# Patient Record
Sex: MALE | Race: WHITE | Employment: OTHER
[De-identification: names, ages, dates, MRNs, and addresses within clinical notes are randomized per-mention and may not be internally consistent; named-entity substitution may affect disease eponyms.]

---

## 2017-02-20 NOTE — H&P
Gastroenterology Outpatient History and Physical    Patient: Colton Jones    Physician: Sherly Silva MD    Vital Signs:See RN notes    Allergies: No Known Allergies    Chief Complaint: personal history colon cancer    History of Present Illness: no symptoms; personal history colon cancer, colon polyps    Justification for Procedure: age    History:  Past Medical History   Diagnosis Date    Cancer ([facility]      thyroiid    Diabetes ([facility]     GERD (gastroesophageal reflux disease)     Hypertension     Stroke ([facility]      3/2015    Thyroid disease      throidectomy r/t ca      Past Surgical History   Procedure Laterality Date    Hx orthopaedic  1/24/2013     R rotator cuff     Pr abdomen surgery proc unlisted  aug 2007     hernia rrepairs    Hx heent  sep 2008     thyroidectomy      Social History     Social History    Marital status:      Spouse name: N/A    Number of children: N/A    Years of education: N/A     Social History Main Topics    Smoking status: Not on file    Smokeless tobacco: Not on file    Alcohol use Not on file    Drug use: Not on file    Sexual activity: Not on file     Other Topics Concern    Not on file     Social History Narrative    No family history on file. Medications:   Prior to Admission medications    Medication Sig Start Date End Date Taking? Authorizing Provider   valsartan (DIOVAN) 160 mg tablet Take  by mouth daily. Historical Provider   B.infantis-B.ani-B.long-B.bifi (PROBIOTIC 4X) 10-15 mg TbEC Take  by mouth. Historical Provider   atorvastatin (LIPITOR) 10 mg tablet Take 10 mg by mouth daily. Historical Provider   metFORMIN (GLUCOPHAGE) 500 mg tablet Take 500 mg by mouth two (2) times daily (with meals). Historical Provider   levothyroxine (SYNTHROID) 175 mcg tablet Take 175 mcg by mouth Daily (before breakfast). Historical Provider   metoprolol (LOPRESSOR) 100 mg tablet Take 50 mg by mouth daily.     Historical Provider   amLODIPine (NORVASC) 10 mg tablet Take 10 mg by mouth daily. Historical Provider   hydrochlorothiazide (HYDRODIURIL) 25 mg tablet Take 25 mg by mouth daily. Historical Provider   amLODIPine-atorvastatatin (CADUET) 10-10 mg per tablet Take 1 Tab by mouth daily. Historical Provider   Dexlansoprazole (DEXILANT) 60 mg CpDM Take 60 mg by mouth daily. Historical Provider   clopidogrel (PLAVIX) 75 mg tablet Take 75 mg by mouth daily. Historical Provider   travoprost (TRAVATAN Z) 0.004 % ophthalmic solution Administer 1 Drop to both eyes every evening. Historical Provider   insulin mixture 75-25 (HUMALOG MIX 75-25) 100 unit/mL (75-25) Susp 35 Units by SubCUTAneous route daily (after breakfast). Historical Provider   insulin mixture 75-25 (HUMALOG MIX 75-25) 100 unit/mL (75-25) Susp 25 Units by SubCUTAneous route daily (after dinner). Historical Provider   cetirizine (ZYRTEC) 10 mg tablet Take 10 mg by mouth daily. Historical Provider   omega-3 fatty acids-vitamin e (FISH OIL) 1,000 mg cap Take 1 Cap by mouth daily. Historical Provider   multivitamin (ONE A DAY) tablet Take 1 Tab by mouth daily. Historical Provider   Aspirin, Buffered 81 mg Tab Take 81 mg by mouth. Historical Provider       Physical Exam:   General: alert, cooperative, no distress   HEENT: Head: Normocephalic, no lesions, without obvious abnormality. Heart: regular rate and rhythm   Lungs: chest clear, no wheezing, rales, normal symmetric air entry   Abdominal: Bowel sounds are normal, liver is not enlarged, spleen is not enlarged           Findings/Diagnosis: personal history colon cancer    Plan of Care/Planned Procedure: I've discussed colonoscopy possible biopsy, polypectomy, cautery, injection, alternatives, complications including but not limited to pain, cardiopulmonary event, bleeding, perforation requiring additional blood transfusion or operative repair; all questions answered.     Signed By: Delgado Soto MD     February 27, 2017

## 2017-02-27 ENCOUNTER — SURGERY (OUTPATIENT)
Age: 77
End: 2017-02-27

## 2017-02-27 ENCOUNTER — ANESTHESIA (OUTPATIENT)
Dept: ENDOSCOPY | Age: 77
End: 2017-02-27
Payer: MEDICARE

## 2017-02-27 ENCOUNTER — HOSPITAL ENCOUNTER (OUTPATIENT)
Age: 77
Setting detail: OUTPATIENT SURGERY
Discharge: HOME | End: 2017-02-27
Attending: INTERNAL MEDICINE | Admitting: INTERNAL MEDICINE
Payer: MEDICARE

## 2017-02-27 ENCOUNTER — ANESTHESIA EVENT (OUTPATIENT)
Dept: ENDOSCOPY | Age: 77
End: 2017-02-27
Payer: MEDICARE

## 2017-02-27 VITALS
HEART RATE: 58 BPM | RESPIRATION RATE: 18 BRPM | HEIGHT: 67 IN | BODY MASS INDEX: 31.39 KG/M2 | TEMPERATURE: 97.9 F | DIASTOLIC BLOOD PRESSURE: 70 MMHG | SYSTOLIC BLOOD PRESSURE: 169 MMHG | WEIGHT: 200 LBS | OXYGEN SATURATION: 95 %

## 2017-02-27 LAB
GLUCOSE BLD STRIP.AUTO-MCNC: 117 MG/DL (ref 65–100)
SERVICE CMNT-IMP: ABNORMAL

## 2017-02-27 PROCEDURE — 74011250636 HC RX REV CODE- 250/636: Performed by: INTERNAL MEDICINE

## 2017-02-27 PROCEDURE — 74011250636 HC RX REV CODE- 250/636

## 2017-02-27 PROCEDURE — 76060000031 HC ANESTHESIA FIRST 0.5 HR: Performed by: INTERNAL MEDICINE

## 2017-02-27 PROCEDURE — 76040000019: Performed by: INTERNAL MEDICINE

## 2017-02-27 PROCEDURE — 77030013992 HC SNR POLYP ENDOSC BSC -B: Performed by: INTERNAL MEDICINE

## 2017-02-27 PROCEDURE — 88305 TISSUE EXAM BY PATHOLOGIST: CPT | Performed by: INTERNAL MEDICINE

## 2017-02-27 PROCEDURE — 82962 GLUCOSE BLOOD TEST: CPT

## 2017-02-27 RX ORDER — DEXTROMETHORPHAN/PSEUDOEPHED 2.5-7.5/.8
1.2 DROPS ORAL
Status: DISCONTINUED | OUTPATIENT
Start: 2017-02-27 | End: 2017-02-27 | Stop reason: HOSPADM

## 2017-02-27 RX ORDER — PROPOFOL 10 MG/ML
INJECTION, EMULSION INTRAVENOUS AS NEEDED
Status: DISCONTINUED | OUTPATIENT
Start: 2017-02-27 | End: 2017-02-27 | Stop reason: HOSPADM

## 2017-02-27 RX ORDER — PROPOFOL 10 MG/ML
INJECTION, EMULSION INTRAVENOUS
Status: DISCONTINUED | OUTPATIENT
Start: 2017-02-27 | End: 2017-02-27 | Stop reason: HOSPADM

## 2017-02-27 RX ORDER — NALOXONE HYDROCHLORIDE 0.4 MG/ML
0.4 INJECTION, SOLUTION INTRAMUSCULAR; INTRAVENOUS; SUBCUTANEOUS
Status: DISCONTINUED | OUTPATIENT
Start: 2017-02-27 | End: 2017-02-27 | Stop reason: HOSPADM

## 2017-02-27 RX ORDER — FENTANYL CITRATE 50 UG/ML
100 INJECTION, SOLUTION INTRAMUSCULAR; INTRAVENOUS
Status: DISCONTINUED | OUTPATIENT
Start: 2017-02-27 | End: 2017-02-27 | Stop reason: HOSPADM

## 2017-02-27 RX ORDER — VALSARTAN 80 MG/1
80 TABLET ORAL DAILY
COMMUNITY

## 2017-02-27 RX ORDER — FLUMAZENIL 0.1 MG/ML
0.2 INJECTION INTRAVENOUS
Status: DISCONTINUED | OUTPATIENT
Start: 2017-02-27 | End: 2017-02-27 | Stop reason: HOSPADM

## 2017-02-27 RX ORDER — MIDAZOLAM HYDROCHLORIDE 1 MG/ML
.25-5 INJECTION, SOLUTION INTRAMUSCULAR; INTRAVENOUS
Status: DISCONTINUED | OUTPATIENT
Start: 2017-02-27 | End: 2017-02-27 | Stop reason: HOSPADM

## 2017-02-27 RX ORDER — ATROPINE SULFATE 0.1 MG/ML
0.5 INJECTION INTRAVENOUS
Status: DISCONTINUED | OUTPATIENT
Start: 2017-02-27 | End: 2017-02-27 | Stop reason: HOSPADM

## 2017-02-27 RX ORDER — EPINEPHRINE 0.1 MG/ML
1 INJECTION INTRACARDIAC; INTRAVENOUS
Status: DISCONTINUED | OUTPATIENT
Start: 2017-02-27 | End: 2017-02-27 | Stop reason: HOSPADM

## 2017-02-27 RX ORDER — SODIUM CHLORIDE 9 MG/ML
50 INJECTION, SOLUTION INTRAVENOUS CONTINUOUS
Status: DISCONTINUED | OUTPATIENT
Start: 2017-02-27 | End: 2017-02-27 | Stop reason: HOSPADM

## 2017-02-27 RX ADMIN — PROPOFOL 80 MG: 10 INJECTION, EMULSION INTRAVENOUS at 08:57

## 2017-02-27 RX ADMIN — SODIUM CHLORIDE 50 ML/HR: 900 INJECTION, SOLUTION INTRAVENOUS at 08:58

## 2017-02-27 RX ADMIN — PROPOFOL 100 MCG/KG/MIN: 10 INJECTION, EMULSION INTRAVENOUS at 08:57

## 2017-02-27 RX ADMIN — PROPOFOL 40 MG: 10 INJECTION, EMULSION INTRAVENOUS at 08:58

## 2017-02-27 RX ADMIN — PROPOFOL 20 MG: 10 INJECTION, EMULSION INTRAVENOUS at 08:59

## 2017-02-27 NOTE — ANESTHESIA POSTPROCEDURE EVALUATION
Post-Anesthesia Evaluation and Assessment    Patient: Peg Kim MRN: [id number]  SSN: [id number]    YOB: 1940  Age: 68 y.o. Sex: male       Cardiovascular Function/Vital Signs  Visit Vitals    /72    Pulse 66    Temp 36.8 °C (98.2 °F)    Resp 13    Ht 5' 7\" (1.702 m)    Wt 90.7 kg (200 lb)    SpO2 96%    BMI 31.32 kg/m2       Patient is status post MAC anesthesia for Procedure(s):  COLONOSCOPY  ENDOSCOPIC POLYPECTOMY. Nausea/Vomiting: None    Postoperative hydration reviewed and adequate. Pain:  Pain Scale 1: Numeric (0 - 10) (02/27/17 0827)  Pain Intensity 1: 0 (02/27/17 0827)   Managed    Neurological Status: At baseline    Mental Status and Level of Consciousness: Arousable    Pulmonary Status:   O2 Device: Room air (02/27/17 0827)   Adequate oxygenation and airway patent    Complications related to anesthesia: None    Post-anesthesia assessment completed.  No concerns    Signed By: Trisha Duncan MD     February 27, 2017

## 2017-02-27 NOTE — PROGRESS NOTES
See anesthesia flow-sheet for procedural sedation and vital signs. No respiratory distress. Abdomen without distention. Stable for transfer to recovery per anesthesia. Polyp specimens sent to lab.

## 2017-02-27 NOTE — IP AVS SNAPSHOT
Daphnie Mckinnon 
 
 
 [address] [place] [address] 
[phone] Patient: Hedy Bautista MRN: [id number] ATU:5/54/9760 You are allergic to the following No active allergies Recent Documentation Height  
  
  
  
  
  
 1.702 m Emergency Contacts Name Discharge Info Relation Home Work Mobile [place]  Spouse [3] [phone] About your hospitalization You were admitted on:  February 27, 2017 You last received care in the:  [facility] ENDOSCOPY You were discharged on:  February 27, 2017 Unit phone number:  [phone] Why you were hospitalized Your primary diagnosis was:  Not on File Providers Seen During Your Hospitalizations Provider Role Specialty Primary office phone Shannan Lopez MD Attending Provider Gastroenterology [phone] Your Primary Care Physician (PCP) Primary Care Physician Office Phone Office Fax [address] [phone] Follow-up Information Follow up With Details Comments Contact Info Chilango Del Cid MD   [place] [address] 
[phone] Current Discharge Medication List  
  
CONTINUE these medications which have NOT CHANGED Dose & Instructions Dispensing Information Comments Morning Noon Evening Bedtime  
 amLODIPine 10 mg tablet Commonly known as:  Lizett Fraction Your next dose is: Today, Tomorrow Other:  _________ Dose:  10 mg Take 10 mg by mouth daily. Refills:  0  
     
   
   
   
  
 aspirin, buffered 81 mg Tab Your next dose is: Today, Tomorrow Other:  _________ Dose:  81 mg Take 81 mg by mouth. Refills:  0  
     
   
   
   
  
 atorvastatin 10 mg tablet Commonly known as:  LIPITOR Your next dose is: Today, Tomorrow Other:  _________ Dose:  10 mg Take 10 mg by mouth daily. Refills:  0  
     
   
   
   
  
 cetirizine 10 mg tablet Commonly known as:  ZYRTEC Your next dose is: Today, Tomorrow Other:  _________ Dose:  10 mg Take 10 mg by mouth daily. Refills:  0  
     
   
   
   
  
 clopidogrel 75 mg Tab Commonly known as:  PLAVIX Your next dose is: Today, Tomorrow Other:  _________ Dose:  75 mg Take 75 mg by mouth daily. Refills:  0 DEXILANT 60 mg Cpdb Generic drug:  Dexlansoprazole Your next dose is: Today, Tomorrow Other:  _________ Dose:  60 mg Take 60 mg by mouth daily. Refills:  0  
     
   
   
   
  
 DIOVAN 80 mg tablet Generic drug:  valsartan Your next dose is: Today, Tomorrow Other:  _________ Dose:  80 mg Take 80 mg by mouth daily. Takes 1 and 1/2 tablet daily. Total of 120 mg Refills:  0  
     
   
   
   
  
 FISH OIL 1,000 mg Cap Generic drug:  omega-3 fatty acids-vitamin e Your next dose is: Today, Tomorrow Other:  _________ Dose:  1 Cap Take 1 Cap by mouth daily. Refills:  0  
     
   
   
   
  
 * HumaLOG Mix 75-25 100 unit/mL (75-25) injection Generic drug:  insulin lispro protamine/insulin lispro Your next dose is: Today, Tomorrow Other:  _________ Dose:  35 Units 35 Units by SubCUTAneous route daily (after breakfast). Refills:  0  
     
   
   
   
  
 * HumaLOG Mix 75-25 100 unit/mL (75-25) injection Generic drug:  insulin lispro protamine/insulin lispro Your next dose is: Today, Tomorrow Other:  _________ Dose:  25 Units 25 Units by SubCUTAneous route Daily (before dinner). Refills:  0  
     
   
   
   
  
 hydroCHLOROthiazide 25 mg tablet Commonly known as:  HYDRODIURIL Your next dose is: Today, Tomorrow Other:  _________ Dose:  25 mg Take 25 mg by mouth daily. Refills:  0 levothyroxine 175 mcg tablet Commonly known as:  SYNTHROID Your next dose is: Today, Tomorrow Other:  _________ Dose:  175 mcg Take 175 mcg by mouth Daily (before breakfast). Refills:  0  
     
   
   
   
  
 metFORMIN 500 mg tablet Commonly known as:  GLUCOPHAGE Your next dose is: Today, Tomorrow Other:  _________ Dose:  1000 mg Take 1,000 mg by mouth two (2) times daily (with meals). Refills:  0  
     
   
   
   
  
 metoprolol tartrate 100 mg IR tablet Commonly known as:  LOPRESSOR Your next dose is: Today, Tomorrow Other:  _________ Dose:  50 mg Take 50 mg by mouth daily. Refills:  0  
     
   
   
   
  
 multivitamin tablet Commonly known as:  ONE A DAY Your next dose is: Today, Tomorrow Other:  _________ Dose:  1 Tab Take 1 Tab by mouth daily. Refills:  0  
     
   
   
   
  
 TRAVATAN Z 0.004 % ophthalmic solution Generic drug:  travoprost  
   
Your next dose is: Today, Tomorrow Other:  _________ Dose:  1 Drop Administer 1 Drop to both eyes every evening. Refills:  0  
     
   
   
   
  
 * Notice: This list has 2 medication(s) that are the same as other medications prescribed for you. Read the directions carefully, and ask your doctor or other care provider to review them with you. Discharge Instructions Jayla Lala [id number] 
1940 DISCHARGE INSTRUCTIONS Discomfort: 
Redness at IV site- apply warm compress to area; if redness or soreness persist- contact your physician. There may be a slight amount of blood passed from the rectum. Gaseous discomfort - walking, belching will help relieve any discomfort. You may not operate a vehicle for 12 hours. You may not engage in an occupation involving machinery or appliances for rest of today. You may not drink alcoholic beverages for at least 12 hours. Avoid making any critical decisions for at least 24 hours. DIET: 
 High fiber diet.  however -  remember your colon is empty and a heavy meal will produce gas. Avoid these foods:  vegetables, fried / greasy foods, carbonated drinks for today. ACTIVITY: 
You may resume your normal daily activities it is recommended that you spend the remainder of the day resting -  avoid any strenuous activity. CALL M.D. ANY SIGN OF: Increasing pain, nausea, vomiting Abdominal distension (swelling) New increased bleeding (oral or rectal) Fever (chills) Pain in chest area Bloody discharge from nose or mouth Shortness of breath Follow-up Instructions: 
Call Dr. Kurt Jacobs in five years for follow up exam if desired DISCHARGE SUMMARY from Nurse The following personal items collected during your admission are returned to you:  
Dental Appliance: Dental Appliances: Lowers, Uppers Vision: Visual Aid: Glasses Hearing Aid:   
Jewelry:   
Clothing:   
Other Valuables:   
Valuables sent to safe:   
 
 
Discharge Orders None Introducing [facility]! Francisco J Watson introduces [employer] patient portal. Now you can access parts of your medical record, email your doctor's office, and request medication refills online. 1. In your internet browser, go to [email or web address]://[email or web address] 2. Click on the First Time User? Click Here link in the Sign In box. You will see the New Member Sign Up page. 3. Enter your [employer] Access Code exactly as it appears below. You will not need to use this code after youve completed the sign-up process. If you do not sign up before the expiration date, you must request a new code. · [employer] Access Code: [email or web address] Expires: 5/28/2017  8:00 AM 
 
4. Enter the last four digits of your Social Security Number (xxxx) and Date of Birth (mm/dd/yyyy) as indicated and click Submit. You will be taken to the next sign-up page. 5. Create a [employer] ID. This will be your [employer] login ID and cannot be changed, so think of one that is secure and easy to remember. 6. Create a [employer] password. You can change your password at any time. 7. Enter your Password Reset Question and Answer. This can be used at a later time if you forget your password. 8. Enter your e-mail address. You will receive e-mail notification when new information is available in [address] 9. Click Sign Up. You can now view and download portions of your medical record. 10. Click the Download Summary menu link to download a portable copy of your medical information. If you have questions, please visit the Frequently Asked Questions section of the [employer] website. Remember, [employer] is NOT to be used for urgent needs. For medical emergencies, dial 911. Now available from your iPhone and Android! General Information Please provide this summary of care documentation to your next provider. Patient Signature:  ____________________________________________________________ Date:  ____________________________________________________________  
  
Yohana Artist Provider Signature:  ____________________________________________________________ Date:  ____________________________________________________________

## 2017-02-27 NOTE — PROCEDURES
[address]  [phone]      2017    Colonoscopy Procedure Note  Felipa Hernandez  :  1940  [facility] Medical Record Number: [id number]    Indications:     Personal history of colon cancer (screening only)  PCP:  Aidee Bojorquez MD  Anesthesia/Sedation: Conscious Sedation/Moderate Sedation  Endoscopist:  Dr. Mitch Adam  Complications:  None  Estimate Blood Loss:  None    Permit:  The indications, risks, benefits and alternatives were reviewed with the patient or their decision maker who was provided an opportunity to ask questions and all questions were answered. The specific risks of colonoscopy with conscious sedation were reviewed, including but not limited to anesthetic complication, bleeding, adverse drug reaction, missed lesion, infection, IV site reactions, and intestinal perforation which would lead to the need for surgical repair. Alternatives to colonoscopy including radiographic imaging, observation without testing, or laboratory testing were reviewed including the limitations of those alternatives. After considering the options and having all their questions answered, the patient or their decision maker provided both verbal and written consent to proceed. Procedure in Detail:  After obtaining informed consent, positioning of the patient in the left lateral decubitus position, and conduction of a pre-procedure pause or \"time out\" the endoscope was introduced into the anus and advanced to the cecum, which was identified by the ileocecal valve and appendiceal orifice. The quality of the colonic preparation was adequate. A careful inspection was made as the colonoscope was withdrawn, findings and interventions are described below.     Appendiceal orifice photgraphed    Findings:   Two polyps, both under 12 mm size transverse colon; rectum without polyp    Specimens: both polyps removed cold snare    Complications:   None; patient tolerated the procedure well. Estimated blood loss: none    Impression:  colon polyp; personal history rectal cancer    Recommendations:      - Because of age, have offered options follow up exam five years vs no follow up exam    Thank you for entrusting me with this patient's care. Please do not hesitate to contact me with any questions or if I can be of assistance with any of your other patients' GI needs.     Signed By: Elin Hair MD                        February 27, 2017

## 2017-02-27 NOTE — DISCHARGE INSTRUCTIONS
Mena Rhodes  [id number]  1940    DISCHARGE INSTRUCTIONS  Discomfort:  Redness at IV site- apply warm compress to area; if redness or soreness persist- contact your physician. There may be a slight amount of blood passed from the rectum. Gaseous discomfort - walking, belching will help relieve any discomfort. You may not operate a vehicle for 12 hours. You may not engage in an occupation involving machinery or appliances for rest of today. You may not drink alcoholic beverages for at least 12 hours. Avoid making any critical decisions for at least 24 hours. DIET:   High fiber diet. - however -  remember your colon is empty and a heavy meal will produce gas. Avoid these foods:  vegetables, fried / greasy foods, carbonated drinks for today. ACTIVITY:  You may resume your normal daily activities it is recommended that you spend the remainder of the day resting -  avoid any strenuous activity. CALL M.D.   ANY SIGN OF:   Increasing pain, nausea, vomiting  Abdominal distension (swelling)  New increased bleeding (oral or rectal)  Fever (chills)  Pain in chest area  Bloody discharge from nose or mouth  Shortness of breath     Follow-up Instructions:  Call Dr. Song Caldwell in five years for follow up exam if desired      DISCHARGE SUMMARY from Nurse    The following personal items collected during your admission are returned to you:   Dental Appliance: Dental Appliances: Lowers, Uppers  Vision: Visual Aid: Glasses  Hearing Aid:    Jewelry:    Clothing:    Other Valuables:    Valuables sent to safe:

## 2017-02-27 NOTE — PROGRESS NOTES
Jenifer Khan  1940  [id number]    Situation:  Verbal report received from:   Uche Seymour, RN  Procedure: Procedure(s):  COLONOSCOPY  ENDOSCOPIC POLYPECTOMY    Background:    Preoperative diagnosis: RECTAL CA  Postoperative diagnosis: Colon Polyps x2    :  Dr. Elsa Arizmendi  Assistant(s): Endoscopy Technician-1: Humphrey Sandhoff Resto  Endoscopy RN-1: Ofe Reid RN    Specimens:   ID Type Source Tests Collected by Time Destination   1 : Colon Polyps x2 Preservative   Lea Cardozo MD 2/27/2017 0915 Pathology     H. Pylori  no    Assessment:  Intra-procedure medications      Anesthesia gave intra-procedure sedation and medications, see anesthesia flow sheet yes    Intravenous fluids: NS@ KVO     Vital signs stable   yes    Abdominal assessment: round and soft   yes    Recommendation:  Discharge patient per MD order  yes.   Return to floor  outpatient  Family or Friend spouse  Permission to share finding with family or friend yes

## 2017-02-27 NOTE — ANESTHESIA PREPROCEDURE EVALUATION
Anesthetic History   No history of anesthetic complications            Review of Systems / Medical History  Patient summary reviewed, nursing notes reviewed and pertinent labs reviewed    Pulmonary  Within defined limits                 Neuro/Psych       CVA (right sided weakness)       Cardiovascular    Hypertension              Exercise tolerance: >4 METS     GI/Hepatic/Renal     GERD           Endo/Other    Diabetes: using insulin  Hypothyroidism  Cancer (rectal)     Other Findings              Physical Exam    Airway  Mallampati: II  TM Distance: 4 - 6 cm  Neck ROM: normal range of motion   Mouth opening: Normal     Cardiovascular    Rhythm: regular  Rate: normal         Dental    Dentition: Full upper dentures and Full lower dentures     Pulmonary  Breath sounds clear to auscultation               Abdominal         Other Findings            Anesthetic Plan    ASA: 3  Anesthesia type: MAC          Induction: Intravenous  Anesthetic plan and risks discussed with: Patient

## 2018-07-11 ENCOUNTER — OP HISTORICAL/CONVERTED ENCOUNTER (OUTPATIENT)
Dept: OTHER | Age: 78
End: 2018-07-11

## 2018-07-24 ENCOUNTER — OP HISTORICAL/CONVERTED ENCOUNTER (OUTPATIENT)
Dept: OTHER | Age: 78
End: 2018-07-24

## 2018-08-01 ENCOUNTER — OP HISTORICAL/CONVERTED ENCOUNTER (OUTPATIENT)
Dept: OTHER | Age: 78
End: 2018-08-01

## 2018-08-17 ENCOUNTER — ED HISTORICAL/CONVERTED ENCOUNTER (OUTPATIENT)
Dept: OTHER | Age: 78
End: 2018-08-17

## 2018-09-18 ENCOUNTER — OP HISTORICAL/CONVERTED ENCOUNTER (OUTPATIENT)
Dept: OTHER | Age: 78
End: 2018-09-18

## 2018-10-15 ENCOUNTER — OP HISTORICAL/CONVERTED ENCOUNTER (OUTPATIENT)
Dept: OTHER | Age: 78
End: 2018-10-15

## 2018-11-05 ENCOUNTER — OP HISTORICAL/CONVERTED ENCOUNTER (OUTPATIENT)
Dept: OTHER | Age: 78
End: 2018-11-05

## 2018-12-07 ENCOUNTER — OP HISTORICAL/CONVERTED ENCOUNTER (OUTPATIENT)
Dept: OTHER | Age: 78
End: 2018-12-07

## 2019-02-06 ENCOUNTER — OP HISTORICAL/CONVERTED ENCOUNTER (OUTPATIENT)
Dept: OTHER | Age: 79
End: 2019-02-06

## 2019-06-05 ENCOUNTER — OP HISTORICAL/CONVERTED ENCOUNTER (OUTPATIENT)
Dept: OTHER | Age: 79
End: 2019-06-05

## 2019-06-10 ENCOUNTER — OP HISTORICAL/CONVERTED ENCOUNTER (OUTPATIENT)
Dept: OTHER | Age: 79
End: 2019-06-10

## 2019-10-02 ENCOUNTER — OP HISTORICAL/CONVERTED ENCOUNTER (OUTPATIENT)
Dept: OTHER | Age: 79
End: 2019-10-02

## 2020-04-03 ENCOUNTER — OP HISTORICAL/CONVERTED ENCOUNTER (OUTPATIENT)
Dept: OTHER | Age: 80
End: 2020-04-03

## 2020-04-24 ENCOUNTER — OP HISTORICAL/CONVERTED ENCOUNTER (OUTPATIENT)
Dept: OTHER | Age: 80
End: 2020-04-24

## 2020-05-05 ENCOUNTER — OP HISTORICAL/CONVERTED ENCOUNTER (OUTPATIENT)
Dept: OTHER | Age: 80
End: 2020-05-05

## 2020-05-12 ENCOUNTER — OP HISTORICAL/CONVERTED ENCOUNTER (OUTPATIENT)
Dept: OTHER | Age: 80
End: 2020-05-12

## 2020-06-16 ENCOUNTER — OP HISTORICAL/CONVERTED ENCOUNTER (OUTPATIENT)
Dept: OTHER | Age: 80
End: 2020-06-16

## 2020-07-01 ENCOUNTER — OP HISTORICAL/CONVERTED ENCOUNTER (OUTPATIENT)
Dept: OTHER | Age: 80
End: 2020-07-01

## 2020-08-03 ENCOUNTER — OP HISTORICAL/CONVERTED ENCOUNTER (OUTPATIENT)
Dept: OTHER | Age: 80
End: 2020-08-03

## 2020-10-02 ENCOUNTER — HOSPITAL ENCOUNTER (OUTPATIENT)
Dept: CT IMAGING | Age: 80
Discharge: HOME | End: 2020-10-02
Attending: INTERNAL MEDICINE
Payer: MEDICARE

## 2020-10-02 DIAGNOSIS — T81.718A IATROGENIC PULMONARY EMBOLISM AND INFARCTION (HCC): ICD-10-CM

## 2020-10-02 DIAGNOSIS — C20 MALIGNANT NEOPLASM OF RECTUM (HCC): ICD-10-CM

## 2020-10-02 DIAGNOSIS — I26.99 IATROGENIC PULMONARY EMBOLISM AND INFARCTION (HCC): ICD-10-CM

## 2020-10-02 PROCEDURE — 74177 CT ABD & PELVIS W/CONTRAST: CPT

## 2020-10-02 PROCEDURE — 74011000636 HC RX REV CODE- 636: Performed by: INTERNAL MEDICINE

## 2020-10-02 RX ADMIN — IOPAMIDOL 100 ML: 755 INJECTION, SOLUTION INTRAVENOUS at 10:38

## 2021-01-04 ENCOUNTER — TRANSCRIBE ORDER (OUTPATIENT)
Dept: SCHEDULING | Age: 81
End: 2021-01-04

## 2021-01-04 DIAGNOSIS — I26.99 IATROGENIC PULMONARY EMBOLISM AND INFARCTION (HCC): Primary | ICD-10-CM

## 2021-01-04 DIAGNOSIS — T81.718A IATROGENIC PULMONARY EMBOLISM AND INFARCTION (HCC): Primary | ICD-10-CM

## 2021-01-04 DIAGNOSIS — C20 MALIGNANT NEOPLASM OF RECTUM (HCC): ICD-10-CM

## 2021-01-12 ENCOUNTER — HOSPITAL ENCOUNTER (OUTPATIENT)
Dept: LAB | Age: 81
Discharge: HOME | End: 2021-01-12
Attending: INTERNAL MEDICINE
Payer: MEDICARE

## 2021-01-12 ENCOUNTER — TRANSCRIBE ORDER (OUTPATIENT)
Dept: REGISTRATION | Age: 81
End: 2021-01-12

## 2021-01-12 ENCOUNTER — HOSPITAL ENCOUNTER (OUTPATIENT)
Dept: CT IMAGING | Age: 81
Discharge: HOME | End: 2021-01-12
Attending: INTERNAL MEDICINE
Payer: MEDICARE

## 2021-01-12 DIAGNOSIS — C20 MALIGNANT NEOPLASM OF RECTUM (HCC): ICD-10-CM

## 2021-01-12 DIAGNOSIS — I26.99 IATROGENIC PULMONARY EMBOLISM AND INFARCTION (HCC): ICD-10-CM

## 2021-01-12 DIAGNOSIS — T81.718A IATROGENIC PULMONARY EMBOLISM AND INFARCTION (HCC): ICD-10-CM

## 2021-01-12 DIAGNOSIS — T81.718A IATROGENIC PULMONARY EMBOLISM AND INFARCTION (HCC): Primary | ICD-10-CM

## 2021-01-12 DIAGNOSIS — I26.99 IATROGENIC PULMONARY EMBOLISM AND INFARCTION (HCC): Primary | ICD-10-CM

## 2021-01-12 LAB — CREAT SERPL-MCNC: 1.46 MG/DL (ref 0.7–1.3)

## 2021-01-12 PROCEDURE — 82565 ASSAY OF CREATININE: CPT

## 2021-01-12 PROCEDURE — 71260 CT THORAX DX C+: CPT

## 2021-01-12 PROCEDURE — 74177 CT ABD & PELVIS W/CONTRAST: CPT

## 2021-01-12 PROCEDURE — 74011000636 HC RX REV CODE- 636: Performed by: INTERNAL MEDICINE

## 2021-01-12 PROCEDURE — 36415 COLL VENOUS BLD VENIPUNCTURE: CPT

## 2021-01-12 RX ADMIN — IOPAMIDOL 100 ML: 755 INJECTION, SOLUTION INTRAVENOUS at 12:22

## 2021-01-18 ENCOUNTER — TRANSCRIBE ORDER (OUTPATIENT)
Dept: SCHEDULING | Age: 81
End: 2021-01-18

## 2021-01-18 DIAGNOSIS — I26.99 PULMONARY INFARCTION (HCC): Primary | ICD-10-CM

## 2021-01-18 DIAGNOSIS — C20 MALIGNANT NEOPLASM OF RECTUM (HCC): ICD-10-CM

## 2021-03-11 ENCOUNTER — HOSPITAL ENCOUNTER (OUTPATIENT)
Dept: RADIATION THERAPY | Age: 81
Discharge: HOME | End: 2021-03-11

## 2021-04-01 ENCOUNTER — HOSPITAL ENCOUNTER (OUTPATIENT)
Dept: CT IMAGING | Age: 81
Discharge: HOME | End: 2021-04-01
Attending: INTERNAL MEDICINE
Payer: MEDICARE

## 2021-04-01 DIAGNOSIS — I26.99 PULMONARY INFARCTION (HCC): ICD-10-CM

## 2021-04-01 DIAGNOSIS — C20 MALIGNANT NEOPLASM OF RECTUM (HCC): ICD-10-CM

## 2021-04-01 LAB
BUN SERPL-MCNC: 24 MG/DL (ref 6–20)
CREAT SERPL-MCNC: 1.46 MG/DL (ref 0.7–1.3)

## 2021-04-01 PROCEDURE — 82565 ASSAY OF CREATININE: CPT

## 2021-04-01 PROCEDURE — 84520 ASSAY OF UREA NITROGEN: CPT

## 2021-04-01 PROCEDURE — 36415 COLL VENOUS BLD VENIPUNCTURE: CPT

## 2021-04-01 PROCEDURE — 71260 CT THORAX DX C+: CPT

## 2021-04-01 PROCEDURE — 74177 CT ABD & PELVIS W/CONTRAST: CPT

## 2021-04-01 PROCEDURE — 74011000636 HC RX REV CODE- 636: Performed by: INTERNAL MEDICINE

## 2021-04-01 RX ADMIN — IOPAMIDOL 100 ML: 755 INJECTION, SOLUTION INTRAVENOUS at 12:01

## 2021-04-16 ENCOUNTER — TRANSCRIBE ORDER (OUTPATIENT)
Dept: SCHEDULING | Age: 81
End: 2021-04-16

## 2021-04-16 DIAGNOSIS — C20 MALIGNANT NEOPLASM OF RECTUM (HCC): Primary | ICD-10-CM

## 2021-09-07 ENCOUNTER — HOSPITAL ENCOUNTER (OUTPATIENT)
Dept: LAB | Age: 81
Discharge: HOME | End: 2021-09-07
Attending: INTERNAL MEDICINE
Payer: MEDICARE

## 2021-09-07 ENCOUNTER — HOSPITAL ENCOUNTER (OUTPATIENT)
Dept: CT IMAGING | Age: 81
Discharge: HOME | End: 2021-09-07
Attending: INTERNAL MEDICINE
Payer: MEDICARE

## 2021-09-07 ENCOUNTER — TRANSCRIBE ORDER (OUTPATIENT)
Dept: REGISTRATION | Age: 81
End: 2021-09-07

## 2021-09-07 DIAGNOSIS — C20 MALIGNANT NEOPLASM OF RECTUM (HCC): ICD-10-CM

## 2021-09-07 DIAGNOSIS — C20 MALIGNANT NEOPLASM OF RECTUM (HCC): Primary | ICD-10-CM

## 2021-09-07 LAB — CREAT SERPL-MCNC: 1.45 MG/DL (ref 0.7–1.3)

## 2021-09-07 PROCEDURE — 71260 CT THORAX DX C+: CPT

## 2021-09-07 PROCEDURE — 74177 CT ABD & PELVIS W/CONTRAST: CPT

## 2021-09-07 PROCEDURE — 74011000636 HC RX REV CODE- 636: Performed by: INTERNAL MEDICINE

## 2021-09-07 PROCEDURE — 36415 COLL VENOUS BLD VENIPUNCTURE: CPT

## 2021-09-07 PROCEDURE — 82565 ASSAY OF CREATININE: CPT

## 2021-09-07 RX ADMIN — IOPAMIDOL 100 ML: 755 INJECTION, SOLUTION INTRAVENOUS at 11:28

## 2021-10-03 ENCOUNTER — TRANSCRIBE ORDER (OUTPATIENT)
Dept: SCHEDULING | Age: 81
End: 2021-10-03

## 2021-10-03 DIAGNOSIS — I26.99 PULMONARY INFARCTION (HCC): Primary | ICD-10-CM

## 2021-10-06 ENCOUNTER — TRANSCRIBE ORDER (OUTPATIENT)
Dept: SCHEDULING | Age: 81
End: 2021-10-06

## 2021-10-06 DIAGNOSIS — I26.99 PULMONARY EMBOLISM WITH INFARCTION (HCC): ICD-10-CM

## 2021-10-06 DIAGNOSIS — C20 RECTAL CANCER (HCC): Primary | ICD-10-CM

## 2022-01-04 ENCOUNTER — HOSPITAL ENCOUNTER (OUTPATIENT)
Dept: CT IMAGING | Age: 82
Discharge: HOME | End: 2022-01-04
Attending: INTERNAL MEDICINE
Payer: MEDICARE

## 2022-01-04 DIAGNOSIS — C20 RECTAL CANCER (HCC): ICD-10-CM

## 2022-01-04 DIAGNOSIS — I26.99 PULMONARY EMBOLISM WITH INFARCTION (HCC): ICD-10-CM

## 2022-01-04 LAB — CREAT BLD-MCNC: 1.4 MG/DL (ref 0.6–1.3)

## 2022-01-04 PROCEDURE — 74011000636 HC RX REV CODE- 636: Performed by: INTERNAL MEDICINE

## 2022-01-04 PROCEDURE — 74177 CT ABD & PELVIS W/CONTRAST: CPT

## 2022-01-04 PROCEDURE — 71260 CT THORAX DX C+: CPT

## 2022-01-04 PROCEDURE — 82565 ASSAY OF CREATININE: CPT

## 2022-01-04 RX ADMIN — IOPAMIDOL 100 ML: 755 INJECTION, SOLUTION INTRAVENOUS at 09:48

## 2022-04-28 ENCOUNTER — TRANSCRIBE ORDER (OUTPATIENT)
Dept: SCHEDULING | Age: 82
End: 2022-04-28

## 2022-04-28 DIAGNOSIS — C20 MALIGNANT NEOPLASM OF RECTUM (HCC): ICD-10-CM

## 2022-04-28 DIAGNOSIS — I26.99 PULMONARY INFARCTION (HCC): Primary | ICD-10-CM

## 2022-07-18 ENCOUNTER — HOSPITAL ENCOUNTER (OUTPATIENT)
Dept: CT IMAGING | Age: 82
Discharge: HOME | End: 2022-07-18
Attending: INTERNAL MEDICINE
Payer: MEDICARE

## 2022-07-18 DIAGNOSIS — I26.99 PULMONARY INFARCTION (HCC): ICD-10-CM

## 2022-07-18 DIAGNOSIS — C20 MALIGNANT NEOPLASM OF RECTUM (HCC): ICD-10-CM

## 2022-07-18 LAB — CREAT BLD-MCNC: 1.4 MG/DL (ref 0.6–1.3)

## 2022-07-18 PROCEDURE — 74011000636 HC RX REV CODE- 636: Performed by: INTERNAL MEDICINE

## 2022-07-18 PROCEDURE — 74177 CT ABD & PELVIS W/CONTRAST: CPT

## 2022-07-18 PROCEDURE — 82565 ASSAY OF CREATININE: CPT

## 2022-07-18 RX ADMIN — IOPAMIDOL 100 ML: 755 INJECTION, SOLUTION INTRAVENOUS at 10:05

## 2022-07-22 ENCOUNTER — HOSPITAL ENCOUNTER (EMERGENCY)
Age: 82
Discharge: HOME | End: 2022-07-22
Payer: MEDICARE

## 2022-07-22 ENCOUNTER — APPOINTMENT (OUTPATIENT)
Dept: GENERAL RADIOLOGY | Age: 82
End: 2022-07-22
Attending: NURSE PRACTITIONER
Payer: MEDICARE

## 2022-07-22 ENCOUNTER — APPOINTMENT (OUTPATIENT)
Dept: CT IMAGING | Age: 82
End: 2022-07-22
Attending: NURSE PRACTITIONER
Payer: MEDICARE

## 2022-07-22 VITALS
WEIGHT: 182 LBS | TEMPERATURE: 98.3 F | BODY MASS INDEX: 28.56 KG/M2 | OXYGEN SATURATION: 95 % | SYSTOLIC BLOOD PRESSURE: 164 MMHG | HEART RATE: 56 BPM | DIASTOLIC BLOOD PRESSURE: 58 MMHG | RESPIRATION RATE: 18 BRPM | HEIGHT: 67 IN
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
## 2022-07-22 DIAGNOSIS — S96.912A STRAIN OF LEFT ANKLE, INITIAL ENCOUNTER: ICD-10-CM

## 2022-07-22 PROCEDURE — 99284 EMERGENCY DEPT VISIT MOD MDM: CPT

## 2022-07-22 PROCEDURE — 70450 CT HEAD/BRAIN W/O DYE: CPT

## 2022-07-22 PROCEDURE — 73610 X-RAY EXAM OF ANKLE: CPT

## 2022-07-22 PROCEDURE — 74011250637 HC RX REV CODE- 250/637: Performed by: NURSE PRACTITIONER

## 2022-07-22 RX ORDER — ACETAMINOPHEN 325 MG/1
500 TABLET ORAL
Qty: 20 TABLET | Refills: 0 | Status: SHIPPED | OUTPATIENT
Start: 2022-07-22

## 2022-07-22 RX ORDER — HYDROCODONE BITARTRATE AND ACETAMINOPHEN 5; 325 MG/1; MG/1
1 TABLET ORAL ONCE
Status: COMPLETED | OUTPATIENT
Start: 2022-07-22 | End: 2022-07-22

## 2022-07-22 RX ADMIN — HYDROCODONE BITARTRATE AND ACETAMINOPHEN 1 TABLET: 5; 325 TABLET ORAL at 11:55

## 2022-07-22 NOTE — ED PROVIDER NOTES
EMERGENCY DEPARTMENT HISTORY AND PHYSICAL EXAM      Date: 7/22/2022  Patient Name: Wilmer Sands      History of Presenting Illness     Chief Complaint   Patient presents with    Fall    Ankle Pain       History Provided By: Patient    HPI: iWlmer Sands, 80 y.o. male with a past medical history significant  CVA, hypertension, neuropathy, diabetes, rectal cancer  presents to the ED with cc of left ankle pain and edema status post fall yesterday. Patient reports was outside in the yard when he tripped and fell. Does admit to hitting his head. Denies LOC. He reports difficulty bearing weight use of cane, holding onto wall and spouse to ambulate. Reports normally ambulating without use of assistance. Denies any numbness, tingling, discoloration, erythema, warmth, drainage, abrasion, laceration, chest pain, shortness of breath, fever, chills, lightheaded, dizziness. There are no other complaints, changes, or physical findings at this time. PCP: Roshan Vallejo NP    Current Outpatient Medications   Medication Sig Dispense Refill    acetaminophen (TYLENOL) 325 mg tablet Take 1.5 Tablets by mouth every four (4) hours as needed for Pain. 20 Tablet 0    insulin lispro protamine/insulin lispro (HUMALOG MIX 75-25) 100 unit/mL (75-25) injection 25 Units by SubCUTAneous route Daily (before dinner). valsartan (DIOVAN) 80 mg tablet Take 80 mg by mouth daily. Takes 1 and 1/2 tablet daily. Total of 120 mg      atorvastatin (LIPITOR) 10 mg tablet Take 10 mg by mouth daily. metFORMIN (GLUCOPHAGE) 500 mg tablet Take 1,000 mg by mouth two (2) times daily (with meals). levothyroxine (SYNTHROID) 175 mcg tablet Take 175 mcg by mouth Daily (before breakfast). metoprolol (LOPRESSOR) 100 mg tablet Take 50 mg by mouth daily. amLODIPine (NORVASC) 10 mg tablet Take 10 mg by mouth daily. hydrochlorothiazide (HYDRODIURIL) 25 mg tablet Take 25 mg by mouth daily.       Dexlansoprazole (DEXILANT) 60 mg CpDM Take 60 mg by mouth daily. clopidogrel (PLAVIX) 75 mg tablet Take 75 mg by mouth daily. travoprost (TRAVATAN Z) 0.004 % ophthalmic solution Administer 1 Drop to both eyes every evening. insulin mixture 75-25 (HUMALOG MIX 75-25) 100 unit/mL (75-25) Susp 35 Units by SubCUTAneous route daily (after breakfast). cetirizine (ZYRTEC) 10 mg tablet Take 10 mg by mouth daily. omega-3 fatty acids-vitamin e (FISH OIL) 1,000 mg cap Take 1 Cap by mouth daily. multivitamin (ONE A DAY) tablet Take 1 Tab by mouth daily. Aspirin, Buffered 81 mg Tab Take 81 mg by mouth. Past History   Past Medical History:  Past Medical History:   Diagnosis Date    Cancer ([facility] [address]     thyroiid    Diabetes ([facility] [address]     GERD (gastroesophageal reflux disease)     Hypertension     Stroke ([facility] [address]     3/2015    Thyroid disease     throidectomy r/t ca       Past Surgical History:  Past Surgical History:   Procedure Laterality Date    COLONOSCOPY N/A 2/27/2017    COLONOSCOPY performed by Pearl Morales MD at [address] [place] [address] 10    HX HEENT  sep 2008    thyroidectomy    HX ORTHOPAEDIC  1/24/2013    R rotator cuff     DC ABDOMEN SURGERY PROC UNLISTED  aug 2007    hernia rrepairs       Family History:  History reviewed. No pertinent family history. Social History:  Social History     Tobacco Use    Smoking status: Never   Substance Use Topics    Alcohol use: No       Allergies:  No Known Allergies  Review of Systems   Review of Systems   Constitutional:  Negative for chills and fever. Respiratory:  Negative for cough and shortness of breath. Cardiovascular:  Negative for chest pain. Genitourinary:  Negative for dysuria, frequency and urgency. Musculoskeletal:  Positive for arthralgias, gait problem and joint swelling. Negative for myalgias. Skin: Negative. Neurological:  Positive for headaches. Negative for dizziness, weakness, light-headedness and numbness. Psychiatric/Behavioral: Negative. Physical Exam   Physical Exam  Vitals and nursing note reviewed. Constitutional:       General: He is not in acute distress. Appearance: Normal appearance. He is normal weight. He is not ill-appearing or toxic-appearing. HENT:      Head: Normocephalic and atraumatic. Right Ear: Hearing normal.      Left Ear: Hearing normal.      Nose: Nose normal.      Mouth/Throat:      Mouth: Mucous membranes are moist.   Eyes:      General: Lids are normal.      Extraocular Movements: Extraocular movements intact. Pupils: Pupils are equal, round, and reactive to light. Cardiovascular:      Rate and Rhythm: Normal rate and regular rhythm. Pulses: Normal pulses. Radial pulses are 2+ on the right side and 2+ on the left side. Dorsalis pedis pulses are 2+ on the right side and 2+ on the left side. Pulmonary:      Effort: Pulmonary effort is normal. No accessory muscle usage or respiratory distress. Breath sounds: Normal breath sounds. No wheezing or rhonchi. Abdominal:      General: Bowel sounds are normal.      Palpations: Abdomen is soft. Tenderness: There is no abdominal tenderness. There is no right CVA tenderness or left CVA tenderness. Musculoskeletal:      Cervical back: Normal range of motion and neck supple. No muscular tenderness. Right lower leg: No edema. Left lower leg: No edema. Left ankle: Swelling present. Tenderness present. Decreased range of motion. Comments: 2+ distal pulses, less than 2-second capillary refill, active range of motion of toes, knee, hip. Edema noted to left ankle with tenderness to palpation. No warmth, drainage, erythema, laceration, abrasion noted to site. Feet:      Right foot:      Skin integrity: No skin breakdown. Left foot:      Skin integrity: No skin breakdown. Skin:     General: Skin is warm and dry. Capillary Refill: Capillary refill takes less than 2 seconds.       Findings: No abrasion, bruising, ecchymosis, erythema or signs of injury. Neurological:      Mental Status: He is alert and oriented to person, place, and time. GCS: GCS eye subscore is 4. GCS verbal subscore is 5. GCS motor subscore is 6. Cranial Nerves: Cranial nerves are intact. Sensory: Sensation is intact. Psychiatric:         Attention and Perception: Attention normal.         Mood and Affect: Mood normal.         Behavior: Behavior normal. Behavior is cooperative. Cognition and Memory: Cognition normal.       Lab and Diagnostic Study Results   Labs -   No results found for this or any previous visit (from the past 12 hour(s)). Radiologic Studies -   XR Results (most recent):  Results from Hospital Encounter encounter on 07/22/22    XR ANKLE LT MIN 3 V    Narrative  EXAM: XR ANKLE LT MIN 3 V    INDICATION: fall, left ankle pain. COMPARISON: None. FINDINGS: Three views of the left ankle demonstrate no fracture or disruption of  the ankle mortise. There is a joint effusion. Lateral soft tissue swelling is  noted. Impression  Lateral soft tissue swelling and joint effusion are concerning for  ligamentous injury. No acute fracture. CT Results  (Last 48 hours)                 07/22/22 1216  CT HEAD WO CONT Final result    Impression:  No acute process or change compared to the prior exam.               Narrative:  EXAM: CT HEAD WO CONT       INDICATION: fall yesterday, on blood thinner, c/o HA       COMPARISON: 2/6/2019. CONTRAST: None. TECHNIQUE: Unenhanced CT of the head was performed using 5 mm images. Brain and   bone windows were generated. Coronal and sagittal reformats. CT dose reduction   was achieved through use of a standardized protocol tailored for this   examination and automatic exposure control for dose modulation. FINDINGS:   The ventricles and sulci are normal in size, shape and configuration. . Old left   MCA infarct is unchanged in appearance.  There is no intracranial hemorrhage,   extra-axial collection, or mass effect. The basilar cisterns are open. No CT   evidence of acute infarct. The bone windows demonstrate no abnormalities. The visualized portions of the   paranasal sinuses and mastoid air cells are clear. Vascular calcification is   noted. CXR Results  (Last 48 hours)      None            Medical Decision Making and ED Course   - I am the first and primary provider for this patient AND AM THE PRIMARY PROVIDER OF RECORD. I reviewed the vital signs, available nursing notes, past medical history, past surgical history, family history and social history. - Initial assessment performed. The patients presenting problems have been discussed, and the staff are in agreement with the care plan formulated and outlined with them. I have encouraged them to ask questions as they arise throughout their visit. Differential Diagnosis & Medical Decision Making Provider Note: Fracture, strain, dislocation    MDM  Patient on blood thinners. CT head grossly negative. Patient alert oriented x4. Sprain to left ankle. Patient neurovascularly intact. Negative sensation bilaterally 2+ distal pulses less than 2-second capillary refill. No fracture seen on xr patient advised of edema possibly due to ligament or tendon strain. Advised a referral to orthopedic possible need for MRI. Walking boot, Ace wrap, RICE therapy pain management patient wife verbalized understanding stable at time of discharge. Vital Signs-Reviewed the patient's vital signs. Patient Vitals for the past 12 hrs:   Temp Pulse Resp BP SpO2   07/22/22 1140 98.3 °F (36.8 °C) (!) 56 18 (!) 164/58 95 %         ED Course:          Procedures and Critical Care       NOTES   :  I have spent the above critical care time involved in lab review, consultations with specialist, family decision- making, bedside attention and documentation.  This time excludes time spent in any separate billed procedures. During this entire length of time I was immediately available to the patient . Brie Mas NP    Disposition   Disposition: DC- Adult Discharges: All of the diagnostic tests were reviewed and questions answered. Diagnosis, care plan and treatment options were discussed. The patient understands the instructions and will follow up as directed. The patients results have been reviewed with them. They have been counseled regarding their diagnosis. The patient verbally convey understanding and agreement of the signs, symptoms, diagnosis, treatment and prognosis and additionally agrees to follow up as recommended with their PCP in 24 - 48 hours. They also agree with the care-plan and convey that all of their questions have been answered. I have also put together some discharge instructions for them that include: 1) educational information regarding their diagnosis, 2) how to care for their diagnosis at home, as well a 3) list of reasons why they would want to return to the ED prior to their follow-up appointment, should their condition change. Discharged    DISCHARGE PLAN:  1. Current Discharge Medication List        CONTINUE these medications which have NOT CHANGED    Details   !! insulin lispro protamine/insulin lispro (HUMALOG MIX 75-25) 100 unit/mL (75-25) injection 25 Units by SubCUTAneous route Daily (before dinner). valsartan (DIOVAN) 80 mg tablet Take 80 mg by mouth daily. Takes 1 and 1/2 tablet daily. Total of 120 mg      atorvastatin (LIPITOR) 10 mg tablet Take 10 mg by mouth daily. metFORMIN (GLUCOPHAGE) 500 mg tablet Take 1,000 mg by mouth two (2) times daily (with meals). levothyroxine (SYNTHROID) 175 mcg tablet Take 175 mcg by mouth Daily (before breakfast). metoprolol (LOPRESSOR) 100 mg tablet Take 50 mg by mouth daily. amLODIPine (NORVASC) 10 mg tablet Take 10 mg by mouth daily.       hydrochlorothiazide (HYDRODIURIL) 25 mg tablet Take 25 mg by mouth daily. Dexlansoprazole (DEXILANT) 60 mg CpDM Take 60 mg by mouth daily. clopidogrel (PLAVIX) 75 mg tablet Take 75 mg by mouth daily. travoprost (TRAVATAN Z) 0.004 % ophthalmic solution Administer 1 Drop to both eyes every evening. !! insulin mixture 75-25 (HUMALOG MIX 75-25) 100 unit/mL (75-25) Susp 35 Units by SubCUTAneous route daily (after breakfast). cetirizine (ZYRTEC) 10 mg tablet Take 10 mg by mouth daily. omega-3 fatty acids-vitamin e (FISH OIL) 1,000 mg cap Take 1 Cap by mouth daily. multivitamin (ONE A DAY) tablet Take 1 Tab by mouth daily. Aspirin, Buffered 81 mg Tab Take 81 mg by mouth. !! - Potential duplicate medications found. Please discuss with provider. 2.   Follow-up Information       Follow up With Specialties Details Why Contact Info    Lorraine Serrato NP Nurse Practitioner Schedule an appointment as soon as possible for a visit in 2 days As needed, If symptoms worsen [address] Jamari Fay MD Orthopedic Surgery Schedule an appointment as soon as possible for a visit in 1 week If symptoms worsen, As needed [place] [id number]  [phone]            3. Return to ED if worse   4. Current Discharge Medication List        START taking these medications    Details   acetaminophen (TYLENOL) 325 mg tablet Take 1.5 Tablets by mouth every four (4) hours as needed for Pain. Qty: 20 Tablet, Refills: 0  Start date: 7/22/2022             Diagnosis/Clinical Impression     Clinical Impression:   1. Fall, initial encounter    2. Strain of left ankle, initial encounter        Attestations: Lelia LOPEZ NP, am the primary clinician of record. Please note that this dictation was completed with [employer], the [employer] voice recognition software.   Quite often unanticipated grammatical, syntax, homophones, and other interpretive errors are inadvertently transcribed by the computer software. Please disregard these errors. Please excuse any errors that have escaped final proofreading. Thank you.

## 2022-07-22 NOTE — DISCHARGE INSTRUCTIONS
Thank you! Thank you for allowing me to care for you in the emergency department. It is my goal to provide you with excellent care. If you have not received excellent quality care, please ask to speak to the nurse manager. Please fill out the survey that will come to you by mail or email since we listen to your feedback! Below you will find a list of your tests from today's visit. Should you have any questions, please do not hesitate to call the emergency department. Labs  No results found for this or any previous visit (from the past 12 hour(s)). Radiologic Studies  XR ANKLE LT MIN 3 V   Final Result   Lateral soft tissue swelling and joint effusion are concerning for   ligamentous injury. No acute fracture. CT HEAD WO CONT   Final Result   No acute process or change compared to the prior exam.              CT Results  (Last 48 hours)                 07/22/22 1216  CT HEAD WO CONT Final result    Impression:  No acute process or change compared to the prior exam.               Narrative:  EXAM: CT HEAD WO CONT       INDICATION: fall yesterday, on blood thinner, c/o HA       COMPARISON: 2/6/2019. CONTRAST: None. TECHNIQUE: Unenhanced CT of the head was performed using 5 mm images. Brain and   bone windows were generated. Coronal and sagittal reformats. CT dose reduction   was achieved through use of a standardized protocol tailored for this   examination and automatic exposure control for dose modulation. FINDINGS:   The ventricles and sulci are normal in size, shape and configuration. . Old left   MCA infarct is unchanged in appearance. There is no intracranial hemorrhage,   extra-axial collection, or mass effect. The basilar cisterns are open. No CT   evidence of acute infarct. The bone windows demonstrate no abnormalities. The visualized portions of the   paranasal sinuses and mastoid air cells are clear. Vascular calcification is   noted.                  CXR Results (Last 48 hours)      None          ------------------------------------------------------------------------------------------------------------  The exam and treatment you received in the Emergency Department were for an urgent problem and are not intended as complete care. It is important that you follow-up with a doctor, nurse practitioner, or physician assistant to:  (1) confirm your diagnosis,  (2) re-evaluation of changes in your illness and treatment, and  (3) for ongoing care. Please take your discharge instructions with you when you go to your follow-up appointment. If you have any problem arranging a follow-up appointment, contact the Emergency Department. If your symptoms become worse or you do not improve as expected and you are unable to reach your health care provider, please return to the Emergency Department. We are available 24 hours a day. If a prescription has been provided, please have it filled as soon as possible to prevent a delay in treatment. If you have any questions or reservations about taking the medication due to side effects or interactions with other medications, please call your primary care provider or contact the ER.

## 2022-11-10 ENCOUNTER — TRANSCRIBE ORDER (OUTPATIENT)
Dept: SCHEDULING | Age: 82
End: 2022-11-10

## 2022-11-10 DIAGNOSIS — C20 MALIGNANT NEOPLASM OF RECTUM (HCC): Primary | ICD-10-CM

## 2023-01-10 ENCOUNTER — HOSPITAL ENCOUNTER (OUTPATIENT)
Dept: CT IMAGING | Age: 83
Discharge: HOME | End: 2023-01-10
Attending: INTERNAL MEDICINE
Payer: MEDICARE

## 2023-01-10 DIAGNOSIS — C20 MALIGNANT NEOPLASM OF RECTUM (HCC): ICD-10-CM

## 2023-01-10 LAB — CREAT BLD-MCNC: 1.5 MG/DL (ref 0.6–1.3)

## 2023-01-10 PROCEDURE — 82565 ASSAY OF CREATININE: CPT

## 2023-01-10 PROCEDURE — 74177 CT ABD & PELVIS W/CONTRAST: CPT

## 2023-01-10 PROCEDURE — 74011000636 HC RX REV CODE- 636: Performed by: INTERNAL MEDICINE

## 2023-01-10 RX ADMIN — IOPAMIDOL 100 ML: 755 INJECTION, SOLUTION INTRAVENOUS at 09:45

## 2023-02-02 ENCOUNTER — TRANSCRIBE ORDER (OUTPATIENT)
Dept: SCHEDULING | Age: 83
End: 2023-02-02

## 2023-02-02 DIAGNOSIS — C20 MALIGNANT NEOPLASM OF RECTUM (HCC): Primary | ICD-10-CM

## 2023-02-02 DIAGNOSIS — T81.718A IATROGENIC PULMONARY EMBOLISM AND INFARCTION (HCC): ICD-10-CM

## 2023-02-02 DIAGNOSIS — I26.99 IATROGENIC PULMONARY EMBOLISM AND INFARCTION (HCC): ICD-10-CM

## 2023-04-10 ENCOUNTER — HOSPITAL ENCOUNTER (OUTPATIENT)
Dept: CT IMAGING | Age: 83
Discharge: HOME | End: 2023-04-10
Attending: INTERNAL MEDICINE
Payer: MEDICARE

## 2023-04-10 DIAGNOSIS — I26.99 IATROGENIC PULMONARY EMBOLISM AND INFARCTION (HCC): ICD-10-CM

## 2023-04-10 DIAGNOSIS — C20 MALIGNANT NEOPLASM OF RECTUM (HCC): ICD-10-CM

## 2023-04-10 DIAGNOSIS — T81.718A IATROGENIC PULMONARY EMBOLISM AND INFARCTION (HCC): ICD-10-CM

## 2023-04-10 LAB — CREAT BLD-MCNC: 1.5 MG/DL (ref 0.6–1.3)

## 2023-04-10 PROCEDURE — 74177 CT ABD & PELVIS W/CONTRAST: CPT

## 2023-04-10 PROCEDURE — 82565 ASSAY OF CREATININE: CPT

## 2023-04-10 PROCEDURE — 74011000636 HC RX REV CODE- 636: Performed by: INTERNAL MEDICINE

## 2023-04-10 RX ADMIN — IOPAMIDOL 100 ML: 755 INJECTION, SOLUTION INTRAVENOUS at 09:36

## 2023-05-22 RX ORDER — AMLODIPINE BESYLATE 10 MG/1
10 TABLET ORAL DAILY
COMMUNITY

## 2023-05-22 RX ORDER — CLOPIDOGREL BISULFATE 75 MG/1
75 TABLET ORAL DAILY
COMMUNITY

## 2023-05-22 RX ORDER — ACETAMINOPHEN 325 MG/1
487.5 TABLET ORAL EVERY 4 HOURS PRN
COMMUNITY
Start: 2022-07-22

## 2023-05-22 RX ORDER — ATORVASTATIN CALCIUM 10 MG/1
10 TABLET, FILM COATED ORAL DAILY
COMMUNITY

## 2023-05-22 RX ORDER — CETIRIZINE HYDROCHLORIDE 10 MG/1
10 TABLET ORAL DAILY
COMMUNITY

## 2023-05-22 RX ORDER — HYDROCHLOROTHIAZIDE 25 MG/1
25 TABLET ORAL DAILY
COMMUNITY

## 2023-05-22 RX ORDER — DEXLANSOPRAZOLE 60 MG/1
60 CAPSULE, DELAYED RELEASE ORAL DAILY
COMMUNITY

## 2023-05-26 RX ORDER — METOPROLOL TARTRATE 100 MG/1
50 TABLET ORAL DAILY
COMMUNITY

## 2023-05-26 RX ORDER — LEVOTHYROXINE SODIUM 175 UG/1
175 TABLET ORAL
COMMUNITY

## 2023-05-26 RX ORDER — VALSARTAN 80 MG/1
80 TABLET ORAL DAILY
COMMUNITY

## 2023-05-26 RX ORDER — TRAVOPROST OPHTHALMIC SOLUTION 0.04 MG/ML
1 SOLUTION OPHTHALMIC EVERY EVENING
COMMUNITY

## 2023-07-20 ENCOUNTER — HOSPITAL ENCOUNTER (OUTPATIENT)
Facility: HOSPITAL | Age: 83
Discharge: HOME | End: 2023-07-20
Attending: INTERNAL MEDICINE
Payer: MEDICARE

## 2023-07-20 DIAGNOSIS — C20 MALIGNANT NEOPLASM OF RECTUM (HCC): ICD-10-CM

## 2023-07-20 LAB — CREAT BLD-MCNC: 1.6 MG/DL (ref 0.6–1.3)

## 2023-07-20 PROCEDURE — 82565 ASSAY OF CREATININE: CPT

## 2023-07-20 PROCEDURE — 6360000004 HC RX CONTRAST MEDICATION: Performed by: INTERNAL MEDICINE

## 2023-07-20 PROCEDURE — 74177 CT ABD & PELVIS W/CONTRAST: CPT

## 2023-07-20 RX ADMIN — IOPAMIDOL 100 ML: 755 INJECTION, SOLUTION INTRAVENOUS at 09:50

## 2024-01-04 ENCOUNTER — HOSPITAL ENCOUNTER (OUTPATIENT)
Facility: HOSPITAL | Age: 84
Discharge: HOME | End: 2024-01-04
Attending: INTERNAL MEDICINE
Payer: MEDICARE

## 2024-01-04 DIAGNOSIS — I26.99 PULMONARY INFARCTION (HCC): ICD-10-CM

## 2024-01-04 DIAGNOSIS — C20 MALIGNANT NEOPLASM OF RECTUM (HCC): ICD-10-CM

## 2024-01-04 LAB — CREAT BLD-MCNC: 1.4 MG/DL (ref 0.6–1.3)

## 2024-01-04 PROCEDURE — 82565 ASSAY OF CREATININE: CPT

## 2024-01-04 PROCEDURE — 74177 CT ABD & PELVIS W/CONTRAST: CPT

## 2024-01-04 PROCEDURE — 6360000004 HC RX CONTRAST MEDICATION: Performed by: INTERNAL MEDICINE

## 2024-01-04 RX ADMIN — IOPAMIDOL 100 ML: 755 INJECTION, SOLUTION INTRAVENOUS at 11:50

## 2024-05-01 ENCOUNTER — TRANSCRIBE ORDERS (OUTPATIENT)
Facility: HOSPITAL | Age: 84
End: 2024-05-01

## 2024-05-01 DIAGNOSIS — I26.99 OTHER PULMONARY EMBOLISM WITHOUT ACUTE COR PULMONALE, UNSPECIFIED CHRONICITY (HCC): ICD-10-CM

## 2024-05-01 DIAGNOSIS — C20 MALIGNANT NEOPLASM OF RECTUM (HCC): Primary | ICD-10-CM

## 2024-05-29 ENCOUNTER — HOSPITAL ENCOUNTER (OUTPATIENT)
Facility: HOSPITAL | Age: 84
Discharge: HOME | End: 2024-06-01
Attending: INTERNAL MEDICINE
Payer: MEDICARE

## 2024-05-29 DIAGNOSIS — I26.99 OTHER PULMONARY EMBOLISM WITHOUT ACUTE COR PULMONALE, UNSPECIFIED CHRONICITY (HCC): ICD-10-CM

## 2024-05-29 DIAGNOSIS — C20 MALIGNANT NEOPLASM OF RECTUM (HCC): ICD-10-CM

## 2024-05-29 PROCEDURE — 71260 CT THORAX DX C+: CPT

## 2024-05-29 PROCEDURE — 6360000004 HC RX CONTRAST MEDICATION: Performed by: INTERNAL MEDICINE

## 2024-05-29 PROCEDURE — 82565 ASSAY OF CREATININE: CPT

## 2024-05-29 RX ADMIN — DIATRIZOATE MEGLUMINE AND DIATRIZOATE SODIUM 30 ML: 660; 100 LIQUID ORAL; RECTAL at 15:00

## 2024-05-29 RX ADMIN — IOPAMIDOL 100 ML: 755 INJECTION, SOLUTION INTRAVENOUS at 15:00

## 2024-05-30 LAB — CREAT BLD-MCNC: 1.4 MG/DL (ref 0.6–1.3)

## 2024-07-13 ENCOUNTER — HOSPITAL ENCOUNTER (EMERGENCY)
Facility: HOSPITAL | Age: 84
Discharge: HOME | End: 2024-07-13
Attending: STUDENT IN AN ORGANIZED HEALTH CARE EDUCATION/TRAINING PROGRAM
Payer: MEDICARE

## 2024-07-13 ENCOUNTER — APPOINTMENT (OUTPATIENT)
Facility: HOSPITAL | Age: 84
End: 2024-07-13
Payer: MEDICARE

## 2024-07-13 VITALS
BODY MASS INDEX: 28.56 KG/M2 | SYSTOLIC BLOOD PRESSURE: 149 MMHG | TEMPERATURE: 97.6 F | DIASTOLIC BLOOD PRESSURE: 55 MMHG | WEIGHT: 182 LBS | HEART RATE: 56 BPM | OXYGEN SATURATION: 94 % | HEIGHT: 67 IN | RESPIRATION RATE: 20 BRPM

## 2024-07-13 DIAGNOSIS — S01.01XA LACERATION OF SCALP, INITIAL ENCOUNTER: ICD-10-CM
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
## 2024-07-13 DIAGNOSIS — S09.90XA INJURY OF HEAD, INITIAL ENCOUNTER: Primary | ICD-10-CM

## 2024-07-13 PROCEDURE — 6360000002 HC RX W HCPCS: Performed by: STUDENT IN AN ORGANIZED HEALTH CARE EDUCATION/TRAINING PROGRAM

## 2024-07-13 PROCEDURE — 99284 EMERGENCY DEPT VISIT MOD MDM: CPT

## 2024-07-13 PROCEDURE — 90471 IMMUNIZATION ADMIN: CPT | Performed by: STUDENT IN AN ORGANIZED HEALTH CARE EDUCATION/TRAINING PROGRAM

## 2024-07-13 PROCEDURE — 6370000000 HC RX 637 (ALT 250 FOR IP): Performed by: STUDENT IN AN ORGANIZED HEALTH CARE EDUCATION/TRAINING PROGRAM

## 2024-07-13 PROCEDURE — 70450 CT HEAD/BRAIN W/O DYE: CPT

## 2024-07-13 PROCEDURE — 90714 TD VACC NO PRESV 7 YRS+ IM: CPT | Performed by: STUDENT IN AN ORGANIZED HEALTH CARE EDUCATION/TRAINING PROGRAM

## 2024-07-13 RX ORDER — GINSENG 100 MG
CAPSULE ORAL
Status: COMPLETED | OUTPATIENT
Start: 2024-07-13 | End: 2024-07-13

## 2024-07-13 RX ADMIN — BACITRACIN: 500 OINTMENT TOPICAL at 18:14

## 2024-07-13 RX ADMIN — CLOSTRIDIUM TETANI TOXOID ANTIGEN (FORMALDEHYDE INACTIVATED) AND CORYNEBACTERIUM DIPHTHERIAE TOXOID ANTIGEN (FORMALDEHYDE INACTIVATED) 0.5 ML: 5; 2 INJECTION, SUSPENSION INTRAMUSCULAR at 17:34

## 2024-07-13 ASSESSMENT — PAIN SCALES - GENERAL: PAINLEVEL_OUTOF10: 2

## 2024-07-13 ASSESSMENT — PAIN - FUNCTIONAL ASSESSMENT: PAIN_FUNCTIONAL_ASSESSMENT: 0-10

## 2024-07-13 ASSESSMENT — PAIN DESCRIPTION - ORIENTATION: ORIENTATION: RIGHT

## 2024-07-13 ASSESSMENT — PAIN DESCRIPTION - LOCATION: LOCATION: HEAD

## 2024-07-13 ASSESSMENT — PAIN DESCRIPTION - DESCRIPTORS: DESCRIPTORS: DISCOMFORT

## 2024-07-13 NOTE — ED TRIAGE NOTES
PT. TO ED WITH C/O FALL LAST NIGHT.  PT STATES FELL COMING OUT OF BEDROOM GOING INTO BATHROOM.  ? LOC.  OLD BLOOD NOTED RIGHT SIDE OF HEAD. PT. AMBULATORY WITH CANE.

## 2024-07-13 NOTE — ED PROVIDER NOTES
[facility] EMERGENCY DEPT  EMERGENCY DEPARTMENT HISTORY AND PHYSICAL EXAM      Date: 7/13/2024  Patient Name: Jake Ziegler  MRN: [id number]  YOB: 1940  Date of evaluation: 7/13/2024  Provider: Scot Hoyt MD   Note Started: 5:58 PM EDT 7/13/24    HISTORY OF PRESENT ILLNESS     Chief Complaint   Patient presents with    Head Injury    Fall       History Provided By: Patient    HPI: Jake Ziegler is a 84 y.o. male presents to the emergency department for evaluation of head injury.  Patient reportedly fell last night while walking to bathroom.  Unsure if he lost consciousness, did sustain a small abrasion/laceration to right parietal scalp region.  Went back to sleep.  This morning daughters were concerned about the fall.  Patient is not currently on any anticoagulant.  Denies any headaches, denies any extremity pain.    PAST MEDICAL HISTORY   Past Medical History:  Past Medical History:   Diagnosis Date    Cancer (HCC)     thyroiid    Diabetes ([facility])     GERD (gastroesophageal reflux disease)     Hypertension     Stroke ([facility])     3/2015    Thyroid disease     throidectomy r/t ca       Past Surgical History:  Past Surgical History:   Procedure Laterality Date    COLONOSCOPY N/A 2/27/2017    COLONOSCOPY performed by Gilberto Best MD at [facility]COPY    HEENT  sep 2008    thyroidectomy    ORTHOPEDIC SURGERY  1/24/2013    R rotator cuff     OK UNLISTED PROCEDURE ABDOMEN PERITONEUM & OMENTUM  aug 2007    hernia rrepairs       Family History:  History reviewed. No pertinent family history.    Social History:  Social History     Tobacco Use    Smoking status: Never   Substance Use Topics    Alcohol use: No       Allergies:  No Known Allergies    PCP: Kyle Pennington, APRN - NP    Current Meds:   No current facility-administered medications for this encounter.     Current Outpatient Medications   Medication Sig Dispense Refill    levothyroxine (SYNTHROID) 175 MCG tablet Take 1 tablet by mouth every morning  (before breakfast)      metFORMIN (GLUCOPHAGE) 500 MG tablet Take 2 tablets by mouth 2 times daily (with meals)      metoprolol (LOPRESSOR) 100 MG tablet Take 0.5 tablets by mouth daily      Travoprost, BAK Free, (TRAVATAN Z) 0.004 % SOLN ophthalmic solution Apply 1 drop to eye every evening      valsartan (DIOVAN) 80 MG tablet Take 1 tablet by mouth daily      acetaminophen (TYLENOL) 325 MG tablet Take 1.5 tablets by mouth every 4 hours as needed      amLODIPine (NORVASC) 10 MG tablet Take 1 tablet by mouth daily      atorvastatin (LIPITOR) 10 MG tablet Take 1 tablet by mouth daily      cetirizine (ZYRTEC) 10 MG tablet Take 1 tablet by mouth daily      clopidogrel (PLAVIX) 75 MG tablet Take 1 tablet by mouth daily      dexlansoprazole (DEXILANT) 60 MG CPDR delayed release capsule Take 1 capsule by mouth daily      hydroCHLOROthiazide (HYDRODIURIL) 25 MG tablet Take 1 tablet by mouth daily      insulin lispro protamine & lispro (HUMALOG MIX) (75-25) 100 UNIT per ML SUSP injection vial Inject 25 Units into the skin         Social Determinants of Health:   Social Determinants of Health     Tobacco Use: Unknown (7/13/2024)    Patient History     Smoking Tobacco Use: Never     Smokeless Tobacco Use: Unknown     Passive Exposure: Not on file   Alcohol Use: Not on file   Financial Resource Strain: Not on file   Food Insecurity: Not on file   Transportation Needs: Not on file   Physical Activity: Not on file   Stress: Not on file   Social Connections: Not on file   Intimate Partner Violence: Not on file   Depression: Not on file   Housing Stability: Not on file   Interpersonal Safety: Not on file   Utilities: Not on file       PHYSICAL EXAM   Physical Exam  Vitals and nursing note reviewed.   Constitutional:       General: He is not in acute distress.     Appearance: Normal appearance.   HENT:      Head: Normocephalic.        Nose: Nose normal.   Eyes:      Extraocular Movements: Extraocular movements intact.

## 2025-01-07 ENCOUNTER — HOSPITAL ENCOUNTER (OUTPATIENT)
Facility: HOSPITAL | Age: 85
Discharge: HOME | End: 2025-01-10
Attending: INTERNAL MEDICINE
Payer: MEDICARE

## 2025-01-07 DIAGNOSIS — C20 MALIGNANT NEOPLASM OF RECTUM (HCC): ICD-10-CM

## 2025-01-07 DIAGNOSIS — I26.99 PULMONARY INFARCTION (HCC): ICD-10-CM

## 2025-01-07 LAB — CREAT BLD-MCNC: 1.7 MG/DL (ref 0.6–1.3)

## 2025-01-07 PROCEDURE — 71260 CT THORAX DX C+: CPT

## 2025-01-07 PROCEDURE — 6360000004 HC RX CONTRAST MEDICATION: Performed by: INTERNAL MEDICINE

## 2025-01-07 PROCEDURE — 82565 ASSAY OF CREATININE: CPT

## 2025-01-07 RX ORDER — IOPAMIDOL 755 MG/ML
100 INJECTION, SOLUTION INTRAVASCULAR
Status: COMPLETED | OUTPATIENT
Start: 2025-01-07 | End: 2025-01-07

## 2025-01-07 RX ADMIN — IOPAMIDOL 100 ML: 755 INJECTION, SOLUTION INTRAVENOUS at 10:42

## 2025-01-24 ENCOUNTER — HOSPITAL ENCOUNTER (OUTPATIENT)
Facility: HOSPITAL | Age: 85
Discharge: HOME | End: 2025-01-27
Attending: INTERNAL MEDICINE
Payer: MEDICARE

## 2025-01-24 DIAGNOSIS — N18.32 CHRONIC KIDNEY DISEASE, STAGE 3B (HCC): ICD-10-CM

## 2025-01-24 PROCEDURE — 76770 US EXAM ABDO BACK WALL COMP: CPT

## 2025-05-23 ENCOUNTER — TRANSCRIBE ORDERS (OUTPATIENT)
Facility: HOSPITAL | Age: 85
End: 2025-05-23

## 2025-05-23 DIAGNOSIS — I26.99 IATROGENIC PULMONARY EMBOLISM AND INFARCTION, INITIAL ENCOUNTER (HCC): ICD-10-CM

## 2025-05-23 DIAGNOSIS — C20 MALIGNANT NEOPLASM OF RECTUM (HCC): Primary | ICD-10-CM

## 2025-05-23 DIAGNOSIS — T81.718A IATROGENIC PULMONARY EMBOLISM AND INFARCTION, INITIAL ENCOUNTER (HCC): ICD-10-CM

## 2025-08-05 ENCOUNTER — HOSPITAL ENCOUNTER (OUTPATIENT)
Facility: HOSPITAL | Age: 85
Discharge: HOME | End: 2025-08-08
Attending: INTERNAL MEDICINE
Payer: MEDICARE

## 2025-08-05 DIAGNOSIS — C20 MALIGNANT NEOPLASM OF RECTUM (HCC): ICD-10-CM

## 2025-08-05 DIAGNOSIS — T81.718A IATROGENIC PULMONARY EMBOLISM AND INFARCTION, INITIAL ENCOUNTER (HCC): ICD-10-CM

## 2025-08-05 DIAGNOSIS — I26.99 IATROGENIC PULMONARY EMBOLISM AND INFARCTION, INITIAL ENCOUNTER (HCC): ICD-10-CM

## 2025-08-05 LAB — CREAT BLD-MCNC: 1.3 MG/DL (ref 0.6–1.3)

## 2025-08-05 PROCEDURE — 6360000004 HC RX CONTRAST MEDICATION: Performed by: INTERNAL MEDICINE

## 2025-08-05 PROCEDURE — 82565 ASSAY OF CREATININE: CPT

## 2025-08-05 PROCEDURE — 74177 CT ABD & PELVIS W/CONTRAST: CPT

## 2025-08-05 RX ORDER — IOPAMIDOL 755 MG/ML
100 INJECTION, SOLUTION INTRAVASCULAR
Status: COMPLETED | OUTPATIENT
Start: 2025-08-05 | End: 2025-08-05

## 2025-08-05 RX ORDER — DIATRIZOATE MEGLUMINE AND DIATRIZOATE SODIUM 660; 100 MG/ML; MG/ML
30 SOLUTION ORAL; RECTAL
Status: DISCONTINUED | OUTPATIENT
Start: 2025-08-05 | End: 2025-08-09 | Stop reason: HOSPADM

## 2025-08-05 RX ADMIN — IOPAMIDOL 100 ML: 755 INJECTION, SOLUTION INTRAVENOUS at 14:27

## 2025-08-05 RX ADMIN — DIATRIZOATE MEGLUMINE AND DIATRIZOATE SODIUM 30 ML: 660; 100 LIQUID ORAL; RECTAL at 14:28

## 2025-09-03 ENCOUNTER — HOSPITAL ENCOUNTER (OUTPATIENT)
Facility: HOSPITAL | Age: 85
Discharge: HOME | End: 2025-09-05
Attending: STUDENT IN AN ORGANIZED HEALTH CARE EDUCATION/TRAINING PROGRAM
Payer: MEDICARE

## 2025-09-03 ENCOUNTER — HOSPITAL ENCOUNTER (EMERGENCY)
Facility: HOSPITAL | Age: 85
Discharge: HOME | End: 2025-09-03
Attending: STUDENT IN AN ORGANIZED HEALTH CARE EDUCATION/TRAINING PROGRAM
Payer: MEDICARE

## 2025-09-03 VITALS
RESPIRATION RATE: 20 BRPM | BODY MASS INDEX: 29.16 KG/M2 | TEMPERATURE: 97.5 F | DIASTOLIC BLOOD PRESSURE: 57 MMHG | HEIGHT: 65 IN | OXYGEN SATURATION: 97 % | SYSTOLIC BLOOD PRESSURE: 164 MMHG | WEIGHT: 175 LBS | HEART RATE: 58 BPM

## 2025-09-03 DIAGNOSIS — M79.89 LEG SWELLING: ICD-10-CM

## 2025-09-03 DIAGNOSIS — R58 ECCHYMOSIS: Primary | ICD-10-CM

## 2025-09-03 LAB
D DIMER PPP FEU-MCNC: 1.78 MG/L FEU (ref 0.19–0.5)
ECHO BSA: 1.91 M2

## 2025-09-03 PROCEDURE — 85379 FIBRIN DEGRADATION QUANT: CPT

## 2025-09-03 PROCEDURE — 36415 COLL VENOUS BLD VENIPUNCTURE: CPT

## 2025-09-03 PROCEDURE — 93971 EXTREMITY STUDY: CPT

## 2025-09-03 ASSESSMENT — PAIN - FUNCTIONAL ASSESSMENT: PAIN_FUNCTIONAL_ASSESSMENT: 0-10

## 2025-09-03 ASSESSMENT — PAIN SCALES - GENERAL: PAINLEVEL_OUTOF10: 6

## (undated) DEVICE — TUBING ADMIN SET INTRAV ARTERI -- CONVERT TO ITEM 340436

## (undated) DEVICE — CONTAINER SPEC 20 ML LID NEUT BUFF FORMALIN 10 % POLYPR STS

## (undated) DEVICE — KIT COLON W/ 1.1OZ LUB AND 2 END

## (undated) DEVICE — BAG SPEC BIOHZRD 10 X 10 IN --

## (undated) DEVICE — BAG BELONG PT PERS CLEAR HANDL

## (undated) DEVICE — 1200 GUARD II KIT W/5MM TUBE W/O VAC TUBE: Brand: GUARDIAN

## (undated) DEVICE — SNARE ENDOSCP M L240CM W27MM SHTH DIA2.4MM CHN 2.8MM OVL

## (undated) DEVICE — SOLIDIFIER MEDC 1200ML -- CONVERT TO 356117

## (undated) DEVICE — TRAP SUC MUCOUS 70ML -- MEDICHOICE MEDLINE

## (undated) DEVICE — CATH IV AUTOGRD BC PNK 20GA 25 -- INSYTE

## (undated) DEVICE — 3M™ CUROS™ DISINFECTING CAP FOR NEEDLELESS CONNECTORS 270/CARTON 20 CARTONS/CASE CFF1-270: Brand: CUROS™

## (undated) DEVICE — SYRINGE 50ML E/T

## (undated) DEVICE — Device

## (undated) DEVICE — BASIN EMESIS 500CC ROSE 250/CS 60/PLT: Brand: MEDEGEN MEDICAL PRODUCTS, LLC

## (undated) DEVICE — KENDALL RADIOLUCENT FOAM MONITORING ELECTRODE -RECTANGULAR SHAPE: Brand: KENDALL

## (undated) DEVICE — CANN NASAL O2 CAPNOGRAPHY AD -- FILTERLINE